# Patient Record
Sex: FEMALE | Race: OTHER | HISPANIC OR LATINO | Employment: UNEMPLOYED | URBAN - METROPOLITAN AREA
[De-identification: names, ages, dates, MRNs, and addresses within clinical notes are randomized per-mention and may not be internally consistent; named-entity substitution may affect disease eponyms.]

---

## 2017-07-06 ENCOUNTER — HOSPITAL ENCOUNTER (EMERGENCY)
Facility: HOSPITAL | Age: 22
Discharge: HOME | End: 2017-07-06
Attending: EMERGENCY MEDICINE

## 2017-07-06 VITALS
WEIGHT: 168 LBS | TEMPERATURE: 98 F | SYSTOLIC BLOOD PRESSURE: 122 MMHG | HEART RATE: 110 BPM | RESPIRATION RATE: 16 BRPM | DIASTOLIC BLOOD PRESSURE: 59 MMHG | OXYGEN SATURATION: 100 %

## 2017-07-06 DIAGNOSIS — K08.89 PAIN, DENTAL: Primary | ICD-10-CM

## 2017-07-06 PROCEDURE — 99284 EMERGENCY DEPT VISIT MOD MDM: CPT

## 2017-07-06 RX ORDER — AMOXICILLIN 500 MG/1
500 CAPSULE ORAL 3 TIMES DAILY
Qty: 21 CAPSULE | Refills: 0 | Status: SHIPPED | OUTPATIENT
Start: 2017-07-06 | End: 2017-07-13

## 2017-07-06 RX ORDER — SENNOSIDES 8.6 MG
650 CAPSULE ORAL EVERY 8 HOURS PRN
Qty: 30 TABLET | Refills: 0 | Status: SHIPPED | OUTPATIENT
Start: 2017-07-06

## 2017-07-08 ENCOUNTER — HOSPITAL ENCOUNTER (EMERGENCY)
Facility: HOSPITAL | Age: 22
Discharge: HOME | End: 2017-07-08
Attending: EMERGENCY MEDICINE

## 2017-07-08 VITALS
HEART RATE: 96 BPM | DIASTOLIC BLOOD PRESSURE: 73 MMHG | SYSTOLIC BLOOD PRESSURE: 116 MMHG | RESPIRATION RATE: 16 BRPM | WEIGHT: 168 LBS | TEMPERATURE: 98.2 F | OXYGEN SATURATION: 100 %

## 2017-07-08 DIAGNOSIS — K12.2 ORAL ABSCESS: Primary | ICD-10-CM

## 2017-07-08 PROCEDURE — 99283 EMERGENCY DEPT VISIT LOW MDM: CPT

## 2017-07-08 RX ORDER — ACETAMINOPHEN 325 MG/1
650 TABLET ORAL ONCE
Status: COMPLETED | OUTPATIENT
Start: 2017-07-08 | End: 2017-07-08

## 2017-07-08 RX ORDER — LIDOCAINE HYDROCHLORIDE 10 MG/ML
10 INJECTION, SOLUTION EPIDURAL; INFILTRATION; INTRACAUDAL; PERINEURAL ONCE
Status: COMPLETED | OUTPATIENT
Start: 2017-07-08 | End: 2017-07-08

## 2017-07-08 RX ADMIN — LIDOCAINE HYDROCHLORIDE 10 ML: 10 INJECTION, SOLUTION EPIDURAL; INFILTRATION; INTRACAUDAL; PERINEURAL at 15:14

## 2017-07-08 RX ADMIN — ACETAMINOPHEN 650 MG: 325 TABLET, FILM COATED ORAL at 14:34

## 2019-07-09 ENCOUNTER — HOSPITAL ENCOUNTER (EMERGENCY)
Facility: HOSPITAL | Age: 24
Discharge: HOME | End: 2019-07-09
Attending: EMERGENCY MEDICINE | Admitting: EMERGENCY MEDICINE
Payer: COMMERCIAL

## 2019-07-09 VITALS
SYSTOLIC BLOOD PRESSURE: 117 MMHG | RESPIRATION RATE: 16 BRPM | WEIGHT: 156.31 LBS | DIASTOLIC BLOOD PRESSURE: 55 MMHG | OXYGEN SATURATION: 100 % | TEMPERATURE: 98.9 F | HEART RATE: 89 BPM

## 2019-07-09 DIAGNOSIS — K52.9 ACUTE GASTROENTERITIS: Primary | ICD-10-CM

## 2019-07-09 LAB
EXT PREG TEST URINE: NORMAL
EXT. CONTROL ED NAV: NORMAL
GLUCOSE SERPL-MCNC: 121 MG/DL (ref 65–140)

## 2019-07-09 PROCEDURE — 82948 REAGENT STRIP/BLOOD GLUCOSE: CPT

## 2019-07-09 PROCEDURE — 81025 URINE PREGNANCY TEST: CPT | Performed by: EMERGENCY MEDICINE

## 2019-07-09 PROCEDURE — 99283 EMERGENCY DEPT VISIT LOW MDM: CPT | Performed by: EMERGENCY MEDICINE

## 2019-07-09 PROCEDURE — 99283 EMERGENCY DEPT VISIT LOW MDM: CPT

## 2019-07-09 RX ORDER — ONDANSETRON 4 MG/1
4 TABLET, ORALLY DISINTEGRATING ORAL ONCE
Status: COMPLETED | OUTPATIENT
Start: 2019-07-09 | End: 2019-07-09

## 2019-07-09 RX ORDER — ONDANSETRON 4 MG/1
4 TABLET, ORALLY DISINTEGRATING ORAL EVERY 8 HOURS PRN
Qty: 20 TABLET | Refills: 0 | Status: SHIPPED | OUTPATIENT
Start: 2019-07-09

## 2019-07-09 RX ADMIN — ONDANSETRON 4 MG: 4 TABLET, ORALLY DISINTEGRATING ORAL at 16:57

## 2019-07-09 NOTE — DISCHARGE INSTRUCTIONS
Gastroenteritis   LO QUE NECESITA SABER:   La gastroenteritis, o gripe estomacal, es damon infección del estómago y los intestinos  INSTRUCCIONES SOBRE EL [facility]ITAL[facility]:   Llame al 911 en juan c de presentar lo siguiente:   · Usted tiene dificultad para respirar o pulso acelerado  Regrese a la jl de emergencias si:   · Usted ve ivis en roldan diarrea  · Usted no puede dejar de vomitar  · Usted no ha orinado en 12 horas  · Usted siente que se va a desmayar  Pregúntele a roldan [place] vitaminas y minerales son adecuados para usted  · Usted tiene fiebre  · Usted continúa con vómitos o diarrea aún después del tratamiento  · Usted ve lombrices en roldan diarrea  · Roldan boca u ojos están secos  Usted no está orinando tanto o con la misma frecuencia  · Usted tiene preguntas o inquietudes acerca de roldan condición o cuidado  Medicamentos:   · Medicamentos,  se pueden administrar para [employer] vómitos o la diarrea, disminuir los calambres abdominales o tratar damon infección  · [place] medicamentos garth se le haya indicado  Consulte con roldan médico si usted hallie que roldan medicamento no le está ayudando o si presenta efectos secundarios  Infórmele si es alérgico a cualquier medicamento  Mantenga damon lista actualizada de los [place], las vitaminas y los productos herbales que charles  Incluya los siguientes datos de los medicamentos: cantidad, frecuencia y motivo de administración  Traiga con usted la lista o los envases de la píldoras a parish citas de seguimiento  Lleve la lista de los medicamentos con usted en juan c de damon emergencia  [place] de roldan síntomas:   · Fre[place] líquidos garth se le haya indicado  Pregunte a roldan médico qué cantidad de líquido debe beber a diario y qué líquidos le recomienda  Es posible que también necesite matty damon solución de rehidratación oral (SRO)   Damon SRO contiene la cantidad [place] de azúcar, sal y minerales en agua para reponer los líquidos corporales  · Consuma alimentos blandos  Cuando usted sienta [place], empiece a comer alimentos suaves y blandos  Ejemplos son los plátanos, sopas claras, dung y puré de [place]  No consuma productos lácteos, alcohol, bebidas azucaradas, o bebidas con cafeína hasta que se sienta mejor  · Descanse el mayor tiempo posible  Cuando se empiece a sentir mejor, comience poco a poco a hacer más cada día  Evite la propagación de la gastroenteritis:  La gastroenteritis se puede propagar fácilmente  Manténgase usted, roldan kirk y parish alrededores limpios para ayudar a evitar la propagación de la gastroenteritis:  · Lávese las katie frecuentemente  Utilice agua y [place]  [employer] las katie después de usar el baño, cambiarle el pañal a un alona o estornudar  Lávese las katie antes de comer o preparar alimentos  · Limpie las superficies y lave la ropa con frecuencia  Lave roldan ropa y parish toallas por separado del keven de la ropa  Limpie las superficies de roldan hogar con limpiador antibacterial o con blanqueador  · Lave y cocine sarthak los alimentos  Lave las verduras crudas antes de cocinar  [address] y [place]  No utilice los mismos platos para las thierry crudas que para otros alimentos  Ponga en el refrigerador inmediatamente cualquier alimento que haya sobrado  · Esté alerta cuando usted vaya de campamento o cuando viaje  Solamente tome agua limpia  No tome agua de los talya o francis a menos que usted purifique o hierva el agua ana cristina  Cuando viaje, tome agua embotellada y no le ponga hielo  No coma fruta con la cáscara  No coma pescado crudo o thierry que no están cocinadas completamente  Acuda a parsih consultas de control con roldan médico según le indicaron  Anote parish preguntas para que se acuerde de hacerlas epifanio parish visitas  © 2017 [address] Information is for End User's use only and may not be sold, redistributed or otherwise used for commercial purposes   All illustrations and images included in CareNotes® are the copyrighted property of A D A M , Inc  or Darien Lyons  Esta información es sólo para uso en educación  Roldan intención no es darle un consejo médico sobre enfermedades o tratamientos  Colsulte con roldan [place] So farmacéutico antes de seguir cualquier régimen médico para saber si es seguro y efectivo para usted

## 2019-07-09 NOTE — ED PROVIDER NOTES
History  Chief Complaint   Patient presents with    Vomiting     pt reports nausea and vomiting since thursday  pt vomited 2 times today  low abd pain  Patient is a 40-year-old female  She has been feeling ill for about 5 days now  She has had nausea and vomiting  It is not bloody  It is not bilious  It is associated with lower abdominal pain  She also has had a lot of diarrhea  Patient has had sweats but no measured fever  No urinary or vaginal complaints  There has been no foreign travel, suspect foods, sick contacts or recent antibiotics  The diarrhea did improved spontaneously  She has not had any diarrhea today  Symptoms are moderate in intensity  No relieving factors  Prior to Admission Medications   Prescriptions Last Dose Informant Patient Reported? Taking?   acetaminophen (TYLENOL) 650 mg CR tablet Not Taking at Unknown time  No No   Sig: Take 1 tablet by mouth every 8 (eight) hours as needed for mild pain   Patient not taking: Reported on 7/9/2019      Facility-Administered Medications: None       Past Medical History:   Diagnosis Date    No known health problems        Past Surgical History:   Procedure Laterality Date    NO PAST SURGERIES         No family history on file  I have reviewed and agree with the history as documented  Social History     Tobacco Use    Smoking status: Never Smoker   Substance Use Topics    Alcohol use: No    Drug use: No        Review of Systems   Constitutional: Negative for chills and fever  HENT: Negative for rhinorrhea and sore throat  Eyes: Negative for pain, redness and visual disturbance  Respiratory: Negative for cough and shortness of breath  Cardiovascular: Negative for chest pain and leg swelling  Gastrointestinal: Positive for abdominal pain, diarrhea and vomiting  Endocrine: Negative for polydipsia and polyuria  Genitourinary: Negative for dysuria, frequency, hematuria, vaginal bleeding and vaginal discharge  Musculoskeletal: Negative for back pain and neck pain  Skin: Negative for rash and wound  Allergic/Immunologic: Negative for immunocompromised state  Neurological: Negative for weakness, numbness and headaches  Hematological: Does not bruise/bleed easily  Psychiatric/Behavioral: Negative for hallucinations and suicidal ideas  All other systems reviewed and are negative  Physical Exam  Physical Exam   Constitutional: She is oriented to person, place, and time  She appears well-developed and well-nourished  No distress  HENT:   Head: Normocephalic and atraumatic  Mouth/Throat: Oropharynx is clear and moist    Eyes: Conjunctivae are normal  Right eye exhibits no discharge  Left eye exhibits no discharge  No scleral icterus  Neck: Normal range of motion  Neck supple  Cardiovascular: Normal rate, regular rhythm, normal heart sounds and intact distal pulses  Exam reveals no gallop and no friction rub  No murmur heard  Pulmonary/Chest: Effort normal and breath sounds normal  No stridor  No respiratory distress  She has no wheezes  She has no rales  Abdominal: Soft  Bowel sounds are normal  She exhibits no distension  There is no tenderness  There is no rebound and no guarding  Musculoskeletal: Normal range of motion  She exhibits no edema, tenderness or deformity  No CVA tenderness  No calf tenderness  Neurological: She is alert and oriented to person, place, and time  She has normal strength  No sensory deficit  GCS eye subscore is 4  GCS verbal subscore is 5  GCS motor subscore is 6  Skin: Skin is warm and dry  No rash noted  She is not diaphoretic  Psychiatric: She has a normal mood and affect  Her behavior is normal    Vitals reviewed        Vital Signs  ED Triage Vitals [07/09/19 1624]   Temperature Pulse Respirations Blood Pressure SpO2   98 9 °F (37 2 °C) 89 16 117/55 100 %      Temp Source Heart Rate Source Patient Position - Orthostatic VS BP Location FiO2 (%)   Temporal Monitor Sitting Right arm --      Pain Score       4           Vitals:    07/09/19 1624   BP: 117/55   Pulse: 89   Patient Position - Orthostatic VS: Sitting         Visual Acuity      ED Medications  Medications   ondansetron (ZOFRAN-ODT) dispersible tablet 4 mg (has no administration in time range)       Diagnostic Studies  Results Reviewed     Procedure Component Value Units Date/Time    POCT pregnancy, urine [[id number]]  (Normal) Resulted:  07/09/19 1646    Lab Status:  Final result Updated:  07/09/19 1646     EXT PREG TEST UR (Ref: Negative) NEG     Control VALID                 No orders to display              Procedures  Procedures       ED Course                               MDM  Number of Diagnoses or Management Options  Diagnosis management comments: Pregnancy test is negative  Patient's abdominal exam is benign  She is well hydrated  There is no tenderness to suggest appendicitis  Patient had both diarrhea and vomiting  This is most likely gastroenteritis  Appropriate for discharge and continued outpatient management  Amount and/or Complexity of Data Reviewed  Clinical lab tests: ordered and reviewed        Disposition  Final diagnoses:   Acute gastroenteritis     Time reflects when diagnosis was documented in both MDM as applicable and the Disposition within this note     Time User Action Codes Description Comment    7/9/2019  4:49 PM Dinora Paul Add [K52 9] Acute gastroenteritis       ED Disposition     ED Disposition Condition Date/Time Comment    Discharge Stable Tue Jul 9, 2019  4:49  [place] discharge to home/self care              Follow-up Information     Follow up With Specialties Details Why Contact Info    [employer]   Follow-up with family doctor if not better in 2-3 days, call for referral [phone]            Patient's Medications   Discharge Prescriptions    ONDANSETRON (ZOFRAN-ODT) 4 MG DISINTEGRATING TABLET    Take 1 tablet (4 mg total) by mouth every 8 (eight) hours as needed for nausea or vomiting       Start Date: 7/9/2019  End Date: --       Order Dose: 4 mg       Quantity: 20 tablet    Refills: 0     No discharge procedures on file      ED Provider  Electronically Signed by           Jett Gould MD  07/09/19 4141

## 2019-07-09 NOTE — ED NOTES
Patient states to RN that she was diagnosed in [facility] with pre-diabetes  ED provider notified  Per ED provider if patient's blood sugar WNL she is safe to be d/c home        Angelika Hernandez RN  07/09/19 7513

## 2019-07-28 ENCOUNTER — HOSPITAL ENCOUNTER (EMERGENCY)
Facility: HOSPITAL | Age: 24
Discharge: HOME | End: 2019-07-28
Attending: EMERGENCY MEDICINE | Admitting: EMERGENCY MEDICINE

## 2019-07-28 VITALS
TEMPERATURE: 98 F | OXYGEN SATURATION: 100 % | DIASTOLIC BLOOD PRESSURE: 59 MMHG | RESPIRATION RATE: 16 BRPM | SYSTOLIC BLOOD PRESSURE: 117 MMHG | HEART RATE: 84 BPM
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
## 2019-07-28 DIAGNOSIS — K13.0 LIP PAIN: ICD-10-CM

## 2019-07-28 PROCEDURE — 99282 EMERGENCY DEPT VISIT SF MDM: CPT | Performed by: PHYSICIAN ASSISTANT

## 2019-07-28 PROCEDURE — 99283 EMERGENCY DEPT VISIT LOW MDM: CPT
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
## 2020-02-12 ENCOUNTER — HOSPITAL ENCOUNTER (EMERGENCY)
Facility: HOSPITAL | Age: 25
Discharge: HOME | End: 2020-02-12
Attending: EMERGENCY MEDICINE
Payer: COMMERCIAL

## 2020-02-12 VITALS
OXYGEN SATURATION: 100 % | RESPIRATION RATE: 16 BRPM | SYSTOLIC BLOOD PRESSURE: 119 MMHG | DIASTOLIC BLOOD PRESSURE: 58 MMHG | TEMPERATURE: 97.5 F | HEART RATE: 77 BPM | WEIGHT: 165.12 LBS

## 2020-02-12 DIAGNOSIS — M62.838 NECK MUSCLE SPASM: Primary | ICD-10-CM

## 2020-02-12 LAB
EXT PREG TEST URINE: NEGATIVE
EXT. CONTROL ED NAV: NORMAL

## 2020-02-12 PROCEDURE — 99283 EMERGENCY DEPT VISIT LOW MDM: CPT

## 2020-02-12 PROCEDURE — 96372 THER/PROPH/DIAG INJ SC/IM: CPT

## 2020-02-12 PROCEDURE — 99283 EMERGENCY DEPT VISIT LOW MDM: CPT | Performed by: PHYSICIAN ASSISTANT

## 2020-02-12 PROCEDURE — 81025 URINE PREGNANCY TEST: CPT | Performed by: PHYSICIAN ASSISTANT

## 2020-02-12 RX ORDER — METHOCARBAMOL 500 MG/1
500 TABLET, FILM COATED ORAL EVERY 12 HOURS PRN
Qty: 14 TABLET | Refills: 0 | Status: SHIPPED | OUTPATIENT
Start: 2020-02-12

## 2020-02-12 RX ORDER — LIDOCAINE 50 MG/G
1 PATCH TOPICAL ONCE
Status: DISCONTINUED | OUTPATIENT
Start: 2020-02-12 | End: 2020-02-12 | Stop reason: HOSPADM

## 2020-02-12 RX ORDER — DIAZEPAM 5 MG/ML
5 INJECTION, SOLUTION INTRAMUSCULAR; INTRAVENOUS ONCE
Status: COMPLETED | OUTPATIENT
Start: 2020-02-12 | End: 2020-02-12

## 2020-02-12 RX ORDER — IBUPROFEN 800 MG/1
800 TABLET ORAL 3 TIMES DAILY
Qty: 21 TABLET | Refills: 0 | Status: SHIPPED | OUTPATIENT
Start: 2020-02-12

## 2020-02-12 RX ORDER — KETOROLAC TROMETHAMINE 30 MG/ML
15 INJECTION, SOLUTION INTRAMUSCULAR; INTRAVENOUS ONCE
Status: COMPLETED | OUTPATIENT
Start: 2020-02-12 | End: 2020-02-12

## 2020-02-12 RX ADMIN — DIAZEPAM 5 MG: 10 INJECTION, SOLUTION INTRAMUSCULAR; INTRAVENOUS at 14:54

## 2020-02-12 RX ADMIN — KETOROLAC TROMETHAMINE 15 MG: 30 INJECTION, SOLUTION INTRAMUSCULAR at 14:53

## 2020-02-12 RX ADMIN — LIDOCAINE 1 PATCH: 50 PATCH TOPICAL at 14:54

## 2020-02-12 NOTE — ED PROVIDER NOTES
History  Chief Complaint   Patient presents with    Neck Pain     Reports "neck spasm" starting Friday and worsening  Reports worse on the left side  Patient is a 54-year-old female with no past medical history, presents emergency department for evaluation of left-sided neck pain x5 days  Patient reports she woke up Friday morning after sleeping wrong with "spasm" of left side of neck  Patient states she has not taken anything for her symptoms  Patient states pain worse with movement  Patient denies fever, chills, radiation of pain down the arms, sore throat, throat swelling, rash  Prior to Admission Medications   Prescriptions Last Dose Informant Patient Reported? Taking?   acetaminophen (TYLENOL) 650 mg CR tablet   No No   Sig: Take 1 tablet by mouth every 8 (eight) hours as needed for mild pain   Patient not taking: Reported on 7/9/2019   ondansetron (ZOFRAN-ODT) 4 mg disintegrating tablet   No No   Sig: Take 1 tablet (4 mg total) by mouth every 8 (eight) hours as needed for nausea or vomiting      Facility-Administered Medications: None       Past Medical History:   Diagnosis Date    No known health problems        Past Surgical History:   Procedure Laterality Date    NO PAST SURGERIES         History reviewed  No pertinent family history  I have reviewed and agree with the history as documented  Social History     Tobacco Use    Smoking status: Never Smoker    Smokeless tobacco: Never Used   Substance Use Topics    Alcohol use: No    Drug use: No       Review of Systems   Constitutional: Negative for chills and fever  HENT: Negative for ear pain and sore throat  Eyes: Negative for redness  Respiratory: Negative for chest tightness and shortness of breath  Cardiovascular: Negative for chest pain, palpitations and leg swelling  Gastrointestinal: Negative for abdominal pain, diarrhea, nausea and vomiting  Genitourinary: Negative for dysuria and hematuria  Musculoskeletal: Positive for neck pain  Negative for back pain  Skin: Negative for rash  Neurological: Negative for weakness and headaches  Psychiatric/Behavioral: Negative for confusion  Physical Exam  Physical Exam   Constitutional: She is oriented to person, place, and time  She appears well-developed and well-nourished  Non-toxic appearance  She does not have a sickly appearance  She does not appear ill  No distress  HENT:   Head: Normocephalic and atraumatic  Right Ear: External ear normal    Left Ear: External ear normal    Nose: Nose normal    Mouth/Throat: Uvula is midline, oropharynx is clear and moist and mucous membranes are normal    Eyes: Pupils are equal, round, and reactive to light  Conjunctivae and EOM are normal    Neck: Normal range of motion  Neck supple  Cardiovascular: Normal rate and regular rhythm  Pulmonary/Chest: Effort normal and breath sounds normal  No stridor  No respiratory distress  She has no decreased breath sounds  She has no wheezes  She has no rhonchi  She has no rales  Musculoskeletal:        Cervical back: She exhibits decreased range of motion (secondary to pain), tenderness (left paraspinal) and spasm  She exhibits no bony tenderness (no midline)  Thoracic back: Normal         Lumbar back: Normal    Neurovascularly intact distally  5/5 strength bilateral upper extremities  Radial pulse 2 +  Cap refill <2 seconds  Sensation intact  Neurological: She is alert and oriented to person, place, and time  She has normal strength and normal reflexes  No sensory deficit  Gait normal  GCS eye subscore is 4  GCS verbal subscore is 5  GCS motor subscore is 6  Reflex Scores:       Patellar reflexes are 2+ on the right side and 2+ on the left side  Skin: Skin is warm and dry  Capillary refill takes less than 2 seconds  No rash noted  Psychiatric: She has a normal mood and affect   Her behavior is normal        Vital Signs  ED Triage Vitals [02/12/20 1419]   Temperature Pulse Respirations Blood Pressure SpO2   97 5 °F (36 4 °C) 77 16 119/58 100 %      Temp Source Heart Rate Source Patient Position - Orthostatic VS BP Location FiO2 (%)   Temporal Monitor Sitting Right arm --      Pain Score       8           Vitals:    02/12/20 1419   BP: 119/58   Pulse: 77   Patient Position - Orthostatic VS: Sitting         Visual Acuity      ED Medications  Medications   lidocaine (LIDODERM) 5 % patch 1 patch (1 patch Topical Medication Applied 2/12/20 1454)   ketorolac (TORADOL) injection 15 mg (15 mg Intramuscular Given 2/12/20 1453)   diazepam (VALIUM) injection 5 mg (5 mg Intramuscular Given 2/12/20 1454)       Diagnostic Studies  Results Reviewed     Procedure Component Value Units Date/Time    POCT pregnancy, urine [[id number]]  (Normal) Resulted:  02/12/20 1447    Lab Status:  Final result Updated:  02/12/20 1447     EXT PREG TEST UR (Ref: Negative) negative     Control valid                 No orders to display              Procedures  Procedures         ED Course                               MDM  Number of Diagnoses or Management Options  Diagnosis management comments: Patient is a 26-year-old female with no past medical history, presents emergency department for evaluation of left-sided neck pain x5 days  Patient reports she woke up Friday morning after sleeping wrong with "spasm" of left side of neck  No midline neck tenderness  Patient well-appearing, nontoxic, afebrile  Suspect muscular spasm of cervical spine  Toradol, Valium and Lidoderm patch provided emergency department with improvement in pain  Rx for Robaxin, Motrin and diclofenac gel provided to patient  Information for orthopedics given a patient of symptoms do not improve  Pt verbalizes understanding and agrees with plan  The management plan was discussed in detail with the patient at bedside and all questions were answered   Prior to discharge, I provided both verbal and written instructions  I discussed with the patient the signs and symptoms for which to return to the emergency department  All questions were answered and patient was comfortable with the plan of care and discharged to home  The patient verbalized understanding of our discussion and plan of care, and agrees to return to the Emergency Department for concerns and progression of illness  Disposition  Final diagnoses:   Neck muscle spasm     Time reflects when diagnosis was documented in both MDM as applicable and the Disposition within this note     Time User Action Codes Description Comment    2/12/2020  3:13 PM Kim Lovelace Add [P23 261] Neck muscle spasm       ED Disposition     ED Disposition Condition Date/Time Comment    Discharge Stable Wed Feb 12, 2020  3:13 PM Iman Mao discharge to home/self care              Follow-up Information     Follow up With Specialties Details Why Contact Info Additional [address] [facility] [place] [facility] Go to  If symptoms worsen [address] [id number]  [address] [place] [address] [address] [place] [address], [address] [place] [address], [phone] [phone]          Patient's Medications   Discharge Prescriptions    DICLOFENAC SODIUM (VOLTAREN) 1 %    Apply 2 g topically 4 (four) times a day       Start Date: 2/12/2020 End Date: --       Order Dose: 2 g       Quantity: 100 g    Refills: 0    IBUPROFEN (MOTRIN) 800 MG TABLET    Take 1 tablet (800 mg total) by mouth 3 (three) times a day       Start Date: 2/12/2020 End Date: --       Order Dose: 800 mg       Quantity: 21 tablet    Refills: 0    METHOCARBAMOL (ROBAXIN) 500 MG TABLET    Take 1 tablet (500 mg total) by mouth every 12 (twelve) hours as needed for muscle spasms       Start Date: 2/12/2020 End Date: --       Order Dose: 500 mg       Quantity: 14 tablet    Refills: 0     No discharge procedures on file      PDMP Review     None          ED Provider  Electronically Signed by           Ankit Ferro PA-C  02/12/20 6830

## 2023-02-22 ENCOUNTER — HOSPITAL ENCOUNTER (EMERGENCY)
Facility: HOSPITAL | Age: 28
Discharge: HOME | End: 2023-02-22
Attending: INTERNAL MEDICINE

## 2023-02-22 VITALS
TEMPERATURE: 97.2 F | SYSTOLIC BLOOD PRESSURE: 134 MMHG | WEIGHT: 172.84 LBS | OXYGEN SATURATION: 100 % | RESPIRATION RATE: 18 BRPM | DIASTOLIC BLOOD PRESSURE: 76 MMHG | HEART RATE: 83 BPM

## 2023-02-22 DIAGNOSIS — M54.9 ACUTE LEFT-SIDED BACK PAIN: Primary | ICD-10-CM

## 2023-02-22 RX ORDER — CYCLOBENZAPRINE HCL 10 MG
10 TABLET ORAL 2 TIMES DAILY PRN
Qty: 20 TABLET | Refills: 0 | Status: SHIPPED | OUTPATIENT
Start: 2023-02-22

## 2023-02-22 RX ORDER — LIDOCAINE 50 MG/G
1 PATCH TOPICAL ONCE
Status: DISCONTINUED | OUTPATIENT
Start: 2023-02-22 | End: 2023-02-22 | Stop reason: HOSPADM

## 2023-02-22 RX ORDER — LIDOCAINE 40 MG/G
CREAM TOPICAL AS NEEDED
Qty: 30 G | Refills: 0 | Status: SHIPPED | OUTPATIENT
Start: 2023-02-22

## 2023-02-22 RX ORDER — KETOROLAC TROMETHAMINE 30 MG/ML
30 INJECTION, SOLUTION INTRAMUSCULAR; INTRAVENOUS ONCE
Status: COMPLETED | OUTPATIENT
Start: 2023-02-22 | End: 2023-02-22

## 2023-02-22 RX ORDER — DIAZEPAM 2 MG/1
2 TABLET ORAL ONCE
Status: COMPLETED | OUTPATIENT
Start: 2023-02-22 | End: 2023-02-22

## 2023-02-22 RX ORDER — NAPROXEN 500 MG/1
500 TABLET ORAL 2 TIMES DAILY WITH MEALS
Qty: 30 TABLET | Refills: 0 | Status: SHIPPED | OUTPATIENT
Start: 2023-02-22

## 2023-02-22 RX ADMIN — KETOROLAC TROMETHAMINE 30 MG: 30 INJECTION, SOLUTION INTRAMUSCULAR; INTRAVENOUS at 18:54

## 2023-02-22 RX ADMIN — LIDOCAINE 1 PATCH: 50 PATCH CUTANEOUS at 18:54

## 2023-02-22 RX ADMIN — DIAZEPAM 2 MG: 2 TABLET ORAL at 18:54

## 2023-02-22 NOTE — Clinical Note
Mary Jane Karimi was seen and treated in our emergency department on 2/22/2023  Diagnosis:     Bertin Key    She may return on this date: 02/23/2023         If you have any questions or concerns, please don't hesitate to call        Reg Mckeon MD    ______________________________           _______________          _______________  [facility] Representative                              Date                                Time

## 2023-02-23 ENCOUNTER — TELEPHONE (OUTPATIENT)
Dept: PHYSICAL THERAPY | Facility: OTHER | Age: 28
End: 2023-02-23

## 2023-02-23 NOTE — TELEPHONE ENCOUNTER
Call placed to the patient per [facility] referral     Unable to get number to connect or ring  Outgoing message states number is restricted or unavailable  This is the 1st attempt to reach the patient  Will defer per protocol

## 2023-02-23 NOTE — ED PROVIDER NOTES
History  Chief Complaint   Patient presents with   • Back Pain     Left back pain x2 days  Took ibuprofen at 10am     HPI  28year-old presents to ED for evaluation of left-sided back pain  She reports pain for the last 2 days  She reports it radiates from her left back down to her left leg  It is worse with certain movements  She tried ibuprofen at 10 AM which helped  She has not taken any other medication since  She denies trying any other therapies  She denies any falls or traumas  No fever, IVDU, urinary retention, new incontinence, weight loss, cancer, pulsatile abdominal mass  No CVA tenderness, abdominal or pelvic pain  No history of sciatica  No history of spinal surgeries  Prior to Admission Medications   Prescriptions Last Dose Informant Patient Reported? Taking?   acetaminophen (TYLENOL) 650 mg CR tablet   No No   Sig: Take 1 tablet by mouth every 8 (eight) hours as needed for mild pain   Patient not taking: Reported on 7/9/2019   diclofenac sodium (VOLTAREN) 1 %   No No   Sig: Apply 2 g topically 4 (four) times a day   ibuprofen (MOTRIN) 800 mg tablet   No No   Sig: Take 1 tablet (800 mg total) by mouth 3 (three) times a day   methocarbamol (ROBAXIN) 500 mg tablet   No No   Sig: Take 1 tablet (500 mg total) by mouth every 12 (twelve) hours as needed for muscle spasms   ondansetron (ZOFRAN-ODT) 4 mg disintegrating tablet   No No   Sig: Take 1 tablet (4 mg total) by mouth every 8 (eight) hours as needed for nausea or vomiting      Facility-Administered Medications: None       Past Medical History:   Diagnosis Date   • No known health problems        Past Surgical History:   Procedure Laterality Date   • NO PAST SURGERIES         History reviewed  No pertinent family history  I have reviewed and agree with the history as documented      E-Cigarette/Vaping     E-Cigarette/Vaping Substances     Social History     Tobacco Use   • Smoking status: Never   • Smokeless tobacco: Never   Substance Use Topics   • Alcohol use: No   • Drug use: No       Review of Systems   All other systems reviewed and are negative  Physical Exam  Physical Exam  Vitals and nursing note reviewed  Constitutional:       General: She is not in acute distress  Appearance: She is well-developed  HENT:      Head: Normocephalic and atraumatic  Eyes:      Conjunctiva/sclera: Conjunctivae normal    Cardiovascular:      Rate and Rhythm: Normal rate and regular rhythm  Heart sounds: No murmur heard  Pulmonary:      Effort: Pulmonary effort is normal  No respiratory distress  Breath sounds: Normal breath sounds  Abdominal:      Palpations: Abdomen is soft  Tenderness: There is no abdominal tenderness  Musculoskeletal:         General: No swelling  Cervical back: Normal and neck supple  Thoracic back: Normal       Lumbar back: Positive right straight leg raise test and positive left straight leg raise test         Back:    Skin:     General: Skin is warm and dry  Capillary Refill: Capillary refill takes less than 2 seconds  Neurological:      Mental Status: She is alert     Psychiatric:         Mood and Affect: Mood normal          Vital Signs  ED Triage Vitals   Temperature Pulse Respirations Blood Pressure SpO2   02/22/23 1833 02/22/23 1833 02/22/23 1833 02/22/23 1833 02/22/23 1833   (!) 97 2 °F (36 2 °C) 83 18 134/76 100 %      Temp Source Heart Rate Source Patient Position - Orthostatic VS BP Location FiO2 (%)   02/22/23 1833 02/22/23 1833 02/22/23 1833 02/22/23 1833 --   Tympanic Monitor Lying Left arm       Pain Score       02/22/23 1854       6           Vitals:    02/22/23 1833   BP: 134/76   Pulse: 83   Patient Position - Orthostatic VS: Lying         Visual Acuity      ED Medications  Medications   lidocaine (LIDODERM) 5 % patch 1 patch (1 patch Topical Medication Applied 2/22/23 1854)   ketorolac (TORADOL) injection 30 mg (30 mg Intramuscular Given 2/22/23 1854)   diazepam (VALIUM) tablet 2 mg (2 mg Oral Given 2/22/23 5194)       Diagnostic Studies  Results Reviewed     None                 No orders to display              Procedures  Procedures         ED Course                                             Medical Decision Making  25-year-old presents to ED for evaluation of left-sided back pain  No fever, IVDU, neuro deficits, point tenderness, saddle anesthesia, trauma, urinary retention, new incontinence, weight loss, cancer, pulsatile abdominal mass  Patient well-appearing, vital signs normal   Femoral pulses equal  No CVA tenderness, abdominal or pelvic pain  Differential diagnosis includes muscle strain, sciatica, lumbar radiculopathy  We will treat symptomatically and placed ambulatory referral to comprehensive [facility]  Patient reported understanding and is in agreement  Acute left-sided back pain: complicated acute illness or injury  Risk  OTC drugs  Prescription drug management  Disposition  Final diagnoses:   Acute left-sided back pain     Time reflects when diagnosis was documented in both MDM as applicable and the Disposition within this note     Time User Action Codes Description Comment    2/22/2023  7:04 PM Erlin Mendiola Add [M54 9] Acute left-sided back pain       ED Disposition     ED Disposition   Discharge    Condition   Stable    Date/Time   Wed Feb 22, 2023  7:04 PM    Comment   [facility] discharge to home/self care                 Follow-up Information    None         Discharge Medication List as of 2/22/2023  7:05 PM      START taking these medications    Details   cyclobenzaprine (FLEXERIL) 10 mg tablet Take 1 tablet (10 mg total) by mouth 2 (two) times a day as needed for muscle spasms, Starting Wed 2/22/2023, Normal      lidocaine (LMX) 4 % cream Apply topically as needed for mild pain, Starting Wed 2/22/2023, Normal      naproxen (Naprosyn) 500 mg tablet Take 1 tablet (500 mg total) by mouth 2 (two) times a day with meals, Starting Wed 2/22/2023, Normal         CONTINUE these medications which have NOT CHANGED    Details   acetaminophen (TYLENOL) 650 mg CR tablet Take 1 tablet by mouth every 8 (eight) hours as needed for mild pain, Starting Thu 7/6/2017, Print      diclofenac sodium (VOLTAREN) 1 % Apply 2 g topically 4 (four) times a day, Starting Wed 2/12/2020, Normal      ibuprofen (MOTRIN) 800 mg tablet Take 1 tablet (800 mg total) by mouth 3 (three) times a day, Starting Wed 2/12/2020, Normal      methocarbamol (ROBAXIN) 500 mg tablet Take 1 tablet (500 mg total) by mouth every 12 (twelve) hours as needed for muscle spasms, Starting Wed 2/12/2020, Normal      ondansetron (ZOFRAN-ODT) 4 mg disintegrating tablet Take 1 tablet (4 mg total) by mouth every 8 (eight) hours as needed for nausea or vomiting, Starting Tue 7/9/2019, Print                 PDMP Review     None          ED Provider  Electronically Signed by           Braulio Sevilla MD  02/22/23 0724

## 2023-02-24 NOTE — TELEPHONE ENCOUNTER
Call placed to the patient per [facility] referral      Unable to get number to connect or ring  message states "number is restricted or unavailable"      This is the 2nd ttempt to reach the patient  Will defer per protocol

## 2023-02-28 NOTE — TELEPHONE ENCOUNTER
Call placed to the patient per [facility] referral     Unable to call out, number is restricted or unavailable    This is the 3rd attempt to reach the patient  Referral closed